# Patient Record
Sex: MALE | NOT HISPANIC OR LATINO | URBAN - METROPOLITAN AREA
[De-identification: names, ages, dates, MRNs, and addresses within clinical notes are randomized per-mention and may not be internally consistent; named-entity substitution may affect disease eponyms.]

---

## 2017-03-02 NOTE — PATIENT DISCUSSION
GLAUCOMA SUSPECT, OU : INTRAOCULAR PRESSURE IS WITHIN ACCEPTABLE LIMITS. NO DROP THERAPY NEEDED AT THIS TIME.

## 2017-05-05 NOTE — PATIENT DISCUSSION
VF 24-2 AND OPTIC DISC PHOTO PERFORMED TODAY, VF OD: NONSPECIFIC, OS VF SHOWS INFERIOR ARCUATE DEFECT, START XALATAN HS OS. FOLLOW UP AS SCHEDULED.

## 2017-05-26 ENCOUNTER — IMPORTED ENCOUNTER (OUTPATIENT)
Dept: URBAN - METROPOLITAN AREA CLINIC 1 | Facility: CLINIC | Age: 67
End: 2017-05-26

## 2017-05-26 PROBLEM — H52.4: Noted: 2017-05-26

## 2017-05-26 PROCEDURE — S0620 ROUTINE OPHTHALMOLOGICAL EXA: HCPCS

## 2017-05-26 NOTE — PATIENT DISCUSSION
1.  Presbyopia: Rx was given for corrective spectacles if indicated. 2.  Cataracts-OU3. COAG suspect-OU-(0.70/0.65)  IOP 16 OU.  -Fm HX. 4.  Return for an appointment in 1 year for 40. with Dr. Krysten Nielson. 5.  Return for an appointment in as needed for 30 OCT and VF 24-2 with Dr. Krysten Nielson.

## 2017-06-22 NOTE — PATIENT DISCUSSION
GLAUCOMA SUSPECT, OD : INTRAOCULAR PRESSURE IS WITHIN ACCEPTABLE LIMITS. NO DROP THERAPY NEEDED AT THIS TIME FOR THE OD.

## 2017-06-22 NOTE — PATIENT DISCUSSION
POAG, OS: INTRAOCULAR PRESSURE IS WITHIN ACCEPTABLE LIMITS OS. CONTINUE XALATAN OS ONLY TO MANAGE INTRAOCULAR PRESSURE. RETURN FOR FOLLOW-UP AS SCHEDULED.

## 2017-08-31 NOTE — PATIENT DISCUSSION
POAG S OD/POAG, OS: INTRAOCULAR PRESSURE AND RNFL OCT ARE WITHIN ACCEPTABLE LIMITS OS. CONTINUE XALATAN ONE DROP IN THE LEFT EYE ONLY TO MANAGE INTRAOCULAR PRESSURE. RETURN FOR FOLLOW-UP AS SCHEDULED.  ESCRIBED REFILL OF XALATAN TO [facility] TODAY

## 2019-07-23 ENCOUNTER — IMPORTED ENCOUNTER (OUTPATIENT)
Dept: URBAN - METROPOLITAN AREA CLINIC 1 | Facility: CLINIC | Age: 69
End: 2019-07-23

## 2019-07-23 PROBLEM — H01.004: Noted: 2019-07-23

## 2019-07-23 PROBLEM — H25.813: Noted: 2019-07-23

## 2019-07-23 PROBLEM — H40.033: Noted: 2019-07-23

## 2019-07-23 PROBLEM — H01.001: Noted: 2019-07-23

## 2019-07-23 PROBLEM — H40.023: Noted: 2019-07-23

## 2019-07-23 PROCEDURE — 92015 DETERMINE REFRACTIVE STATE: CPT

## 2019-07-23 PROCEDURE — 92250 FUNDUS PHOTOGRAPHY W/I&R: CPT

## 2019-07-23 PROCEDURE — 92014 COMPRE OPH EXAM EST PT 1/>: CPT

## 2019-07-23 NOTE — PATIENT DISCUSSION
1.  Cataract OU -- Visually Significant OS and secondary to glare OD discussed the risks benefits alternatives and limitations of cataract surgery. The patient stated a full understanding and a desire to proceed with the procedure. The patient will need to return for preop appointment with cataract measurements and to have any additional questions answered and start pre-operative eye drops as directed. (PMG did not see)Phaco PCL OS then OD. Otherwise follow-up 1 month for a 10/OCT/HVF2. Glaucoma Suspect OU -- (0.700.85) IOP 16 OU today. Unknown Family Hx. Patient is considered high risk. Condition was discussed with patient and patient understands. Photos done today show Cupping OU. Will do work up and testing after Phaco.  Patient was advised to call us with any problems questions or concerns. 3.  Anterior Blepharitis OU -- Daily Hot compresses and lid scrubs were recommended. 4. [place] OURefraction fee discussed and pt agrees to non-covered serviceReturn for an appointment for Ascfreida/H and P. with Dr. Salina Adams.

## 2019-07-23 NOTE — PATIENT DISCUSSION
Glaucoma Suspect OU -- (0.700.85) IOP 16 OU today. Unknown Family Hx. Patient is considered high risk. Condition was discussed with patient and patient understands. Will continue to monitor patient for any progression in condition. Patient was advised to call us with any problems questions or concerns.

## 2019-08-06 ENCOUNTER — IMPORTED ENCOUNTER (OUTPATIENT)
Dept: URBAN - METROPOLITAN AREA CLINIC 1 | Facility: CLINIC | Age: 69
End: 2019-08-06

## 2019-08-06 PROBLEM — H25.813: Noted: 2019-08-06

## 2019-08-06 PROCEDURE — 92136 OPHTHALMIC BIOMETRY: CPT

## 2019-08-06 NOTE — PATIENT DISCUSSION
Glaucoma Suspect OU (CD 0.80/0.85) - Unknown Family Hx. Patient is considered high risk. 3.  [place] OU 4.   Blepharitis OU - Warm compresses and lid scrubs

## 2019-08-06 NOTE — PATIENT DISCUSSION
1.  Cataract OU:  Visually Significant OS secondary to glare OD discussed the risks benefits alternatives and limitations of cataract surgery. The patient stated a full understanding and a desire to proceed with the procedure. Pt understands they will need glasses post-op to achieve their best corrected vision. 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 Willre evaluate after Phaco. Phaco PCL OS (Standard lens standard procedure)  Lifestyle Questionnaire Completed. 2.  Glaucoma Suspect OU (CD 0.80/0.85) - Unknown Family Hx. Patient is considered high risk. 3.  [place] OU 4. Blepharitis OU - Warm compresses and lid scrubs Return for an appointment for Return as scheduled with Dr. Liz Heaton.

## 2019-08-14 ENCOUNTER — IMPORTED ENCOUNTER (OUTPATIENT)
Dept: URBAN - METROPOLITAN AREA CLINIC 1 | Facility: CLINIC | Age: 69
End: 2019-08-14

## 2019-08-15 ENCOUNTER — IMPORTED ENCOUNTER (OUTPATIENT)
Dept: URBAN - METROPOLITAN AREA CLINIC 1 | Facility: CLINIC | Age: 69
End: 2019-08-15

## 2019-08-15 PROBLEM — Z96.1: Noted: 2019-08-15

## 2019-08-15 PROCEDURE — 99024 POSTOP FOLLOW-UP VISIT: CPT

## 2019-08-15 NOTE — PATIENT DISCUSSION
POD#1 CE/IOL OS (Standard) doing well. Use Lotemax BID OS Bromsite Qdaily OS Ocuflox TID OS : Use all three gtts through completion of PO gtt chart regimen/ Per our instructions given to patient.   Post op Warnings Reiterated RTC as scheduled

## 2019-08-22 ENCOUNTER — IMPORTED ENCOUNTER (OUTPATIENT)
Dept: URBAN - METROPOLITAN AREA CLINIC 1 | Facility: CLINIC | Age: 69
End: 2019-08-22

## 2019-08-22 PROBLEM — H25.811: Noted: 2019-08-22

## 2019-08-22 PROCEDURE — 92136 OPHTHALMIC BIOMETRY: CPT

## 2019-08-22 NOTE — PATIENT DISCUSSION
1.  Cataract OD: Visually Significant secondary to glare discussed the risks benefits alternatives and limitations of cataract surgery. The patient stated a full understanding and a desire to proceed with the procedure. Discussed with patient if PO Gtts are more than $120 for all three combined when filling at their Pharmacy please call our office to request generic substitutions. Phaco PCL OD (Standard lens standard procedure) 2. POW#3 (Standard) CE/IOL OS doing well. ***Trace cortex noted in AC OS. Will plan extended use of NSAID beyond 1 month. Use Lotemax BID OS and Bromsite Qdaily OS: Use gtts through completion of PO gtt regimen.  F/u as scheduled 2nd eye

## 2019-08-28 ENCOUNTER — IMPORTED ENCOUNTER (OUTPATIENT)
Dept: URBAN - METROPOLITAN AREA CLINIC 1 | Facility: CLINIC | Age: 69
End: 2019-08-28

## 2019-08-28 NOTE — PATIENT DISCUSSION
POAG S OD/POAG, OS: INTRAOCULAR PRESSURE IS WITHIN ACCEPTABLE LIMITS OS. SLIGHT WORSENING OCT OS&gt;OD SINCE PREVIOUS SCAN. CONTINUE XALATAN ONE DROP IN THE LEFT EYE ONLY TO MANAGE INTRAOCULAR PRESSURE. RETURN FOR FOLLOW-UP AS SCHEDULED. ESCRIBED REFILL OF XALATAN TO [facility] TODAY. WILL SCHEDULE FURTHER GLAUCOMA TESTING.

## 2019-08-29 ENCOUNTER — IMPORTED ENCOUNTER (OUTPATIENT)
Dept: URBAN - METROPOLITAN AREA CLINIC 1 | Facility: CLINIC | Age: 69
End: 2019-08-29

## 2019-08-29 PROBLEM — Z96.1: Noted: 2019-08-29

## 2019-08-29 PROCEDURE — 99024 POSTOP FOLLOW-UP VISIT: CPT

## 2019-08-29 NOTE — PATIENT DISCUSSION
1. POD#1 Phaco/ PCL OD (Standard) - doing well. Use Lotemax BID OD Bromsite Qdaily OD Ocuflox TID OD : Use all three gtts through completion of PO gtt chart regimen/ Per our instructions given. Post op Warnings Reiterated 2. POW#3 Phaco/ PCL OS (Standard) - doing well  Use Lotemax BID OS and Bromsite Qdaily OS: Use gtts through completion of PO gtt regimen. ***H/o Trace cortex noted in AC OS resolved. Will plan extended use of NSAID beyond 1 month. RTC as scheduled

## 2019-09-16 ENCOUNTER — IMPORTED ENCOUNTER (OUTPATIENT)
Dept: URBAN - METROPOLITAN AREA CLINIC 1 | Facility: CLINIC | Age: 69
End: 2019-09-16

## 2019-09-16 PROBLEM — Z09: Noted: 2019-09-16

## 2019-09-16 PROCEDURE — 99024 POSTOP FOLLOW-UP VISIT: CPT

## 2019-09-16 NOTE — PATIENT DISCUSSION
POW#3 Phaco/ PCL OU (Standard OU) doing well Finish PO meds per PO gtt schedule MRX for glasses given Return for an appointment in 4 mo 30 OCT with Dr. Manan Kurtz.

## 2022-04-02 ASSESSMENT — VISUAL ACUITY
OS_CC: 20/400
OS_CC: 20/25
OD_CC: J1+
OS_CC: J1
OS_SC: 20/20
OD_CC: 20/20-1
OS_CC: 20/25
OD_CC: J1+
OS_CC: 20/20-1
OD_SC: 20/20
OD_SC: 20/20
OD_SC: 20/25
OD_GLARE: 20/200
OD_CC: 20/100
OS_CC: J2
OD_CC: 20/25
OS_SC: 20/400
OD_CC: J1
OS_SC: 20/400
OS_CC: 20/20
OD_GLARE: 20/200
OS_CC: J2

## 2022-04-02 ASSESSMENT — TONOMETRY
OD_IOP_MMHG: 13
OS_IOP_MMHG: 16
OD_IOP_MMHG: 16
OS_IOP_MMHG: 16
OS_IOP_MMHG: 16
OD_IOP_MMHG: 15
OS_IOP_MMHG: 15
OD_IOP_MMHG: 16
OD_IOP_MMHG: 16
OS_IOP_MMHG: 15
OS_IOP_MMHG: 14

## 2022-04-02 ASSESSMENT — PACHYMETRY
OD_CT_UM: 595
OS_CT_UM: 583